# Patient Record
(demographics unavailable — no encounter records)

---

## 2025-06-11 NOTE — PHYSICAL EXAM
[Left] : left shoulder [4 ___] : forward flexion 4[unfilled]/5 [4___] : external rotation 4[unfilled]/5 [FreeTextEntry9] : FF 120P ER 30 [] : motor and sensory intact distally

## 2025-06-11 NOTE — HISTORY OF PRESENT ILLNESS
[de-identified] : 06/11/2025: SAIMA TAPIA, a82 year old RHD female, presents today for L shoulder pain. patient stated 06/08/25 she fell during walking in a parking lot.  Reports to landing on the L shoulder at that time.  Pain is less but still present.  Worse with reaching and at night.  NO shooting pains.  No numbness or tingling.  Rest and alleve prn.   Pmhx - htn, high cholesterol.

## 2025-06-11 NOTE — IMAGING
[Left] : left shoulder [There are no fractures, subluxations or dislocations. No significant abnormalities are seen] : There are no fractures, subluxations or dislocations. No significant abnormalities are seen [FreeTextEntry1] : Mild DJD present.  Also calcium deposits present.

## 2025-06-11 NOTE — ASSESSMENT
[FreeTextEntry1] : L shoulder strain with calcific tendinitis from a fall.  Some concern for cuff tear due to weakness.  Xrays reviewed.  Rest, ice, activity modification. Start PT. HEP. Nsaids prn.  If no improvement, will consider MRI versus CSI.   Follow up in 6 weeks.